# Patient Record
Sex: MALE | Race: WHITE | Employment: FULL TIME | URBAN - METROPOLITAN AREA
[De-identification: names, ages, dates, MRNs, and addresses within clinical notes are randomized per-mention and may not be internally consistent; named-entity substitution may affect disease eponyms.]

---

## 2017-09-15 ENCOUNTER — HOSPITAL ENCOUNTER (EMERGENCY)
Facility: CLINIC | Age: 37
Discharge: HOME | End: 2017-09-15
Attending: EMERGENCY MEDICINE | Admitting: EMERGENCY MEDICINE

## 2017-09-15 VITALS
SYSTOLIC BLOOD PRESSURE: 147 MMHG | OXYGEN SATURATION: 96 % | DIASTOLIC BLOOD PRESSURE: 95 MMHG | HEIGHT: 73 IN | RESPIRATION RATE: 16 BRPM | TEMPERATURE: 98.3 F

## 2017-09-15 DIAGNOSIS — K04.7 DENTAL INFECTION: ICD-10-CM

## 2017-09-15 PROCEDURE — 99283 EMERGENCY DEPT VISIT LOW MDM: CPT

## 2017-09-15 PROCEDURE — 99283 EMERGENCY DEPT VISIT LOW MDM: CPT | Performed by: EMERGENCY MEDICINE

## 2017-09-15 RX ORDER — PENICILLIN V POTASSIUM 500 MG/1
500 TABLET, FILM COATED ORAL 4 TIMES DAILY
Qty: 28 TABLET | Refills: 0 | Status: SHIPPED | OUTPATIENT
Start: 2017-09-15 | End: 2017-09-22

## 2017-09-15 NOTE — DISCHARGE INSTRUCTIONS
Dental Abscess  An abscess is a sac of pus. A dental abscess forms when a tooth or the tissue around it becomes infected with bacteria. The bacteria can enter through a cavity or a crack in a tooth. It can also infect the gum tissue or bone around a tooth. An untreated abscess can cause the loss of the tooth. It can even spread to other parts of the body and become life-threatening.    Symptoms of a dental abscess   Signs of a dental abscess include:    Toothache, often severe    Tooth pain with hot, cold, or pressure    Pain in the gums, cheek, or jaw    Bad breath or bitter taste in the mouth    Trouble swallowing or opening the mouth    Fever    Swollen or enlarged glands in the neck  Diagnosing a dental abscess  An abscess is diagnosed by looking at your teeth and gums. You will be told if any tests, like dental X-rays, are needed.  Treating a dental abscess  Treatments for a dental abscess may include the following:    Antibiotic medicines to treat the underlying infection.    Pain relievers to help you feel more comfortable. Your health care provider may prescribe a medicine for you. Or, use over-the-counter pain relievers, like acetaminophen or ibuprofen.    Warm saltwater rinses to soothe discomfort and help clear away pus.    Root canal surgery if needed to save the tooth. With a root canal, the infected part of the tooth is removed. A special substance is then used to fill the empty space in the tooth.    Drainage of the abscess if needed. Incisions are made to allow the infected material to drain from the tooth.    Removal of the tooth in cases of severe infection that can t be treated another way.  If the infection is severe, has spread, or doesn t respond to treatment, you may need to be admitted to a hospital.        When to call the dentist  Call your dentist right away if you have any of the following:    Fever of 100.4 F (38 C) or higher    Increased pain, redness, drainage, or swelling in the  treated area    Swelling of the face or jawbone    Pain that cannot be controlled with medicines   Preventing dental abscess  To prevent another abscess in the future, keep your teeth clean and healthy. Brush twice a day and floss at least once daily. See your dentist for regular tooth cleanings. And avoid sugary foods and drinks that can lead to tooth decay.  Date Last Reviewed: 7/14/2015 2000-2017 [employer]. [address] [place] [address], [place] [address]. All rights reserved. This information is not intended as a substitute for professional medical care. Always follow your healthcare professional's instructions.

## 2017-09-15 NOTE — ED AVS SNAPSHOT
[facility] Emergency Department    [address] [facility] [address]    Phone:  [phone]    Fax:  [phone]                                       Gopi Ashby   MRN: [id number]    Department:  [facility] Emergency Department   Date of Visit:  9/15/2017           Patient Information     Date Of Birth          1980        Your diagnoses for this visit were:     Dental infection        You were seen by John Michele MD.        Discharge Instructions         Dental Abscess  An abscess is a sac of pus. A dental abscess forms when a tooth or the tissue around it becomes infected with bacteria. The bacteria can enter through a cavity or a crack in a tooth. It can also infect the gum tissue or bone around a tooth. An untreated abscess can cause the loss of the tooth. It can even spread to other parts of the body and become life-threatening.    Symptoms of a dental abscess   Signs of a dental abscess include:    Toothache, often severe    Tooth pain with hot, cold, or pressure    Pain in the gums, cheek, or jaw    Bad breath or bitter taste in the mouth    Trouble swallowing or opening the mouth    Fever    Swollen or enlarged glands in the neck  Diagnosing a dental abscess  An abscess is diagnosed by looking at your teeth and gums. You will be told if any tests, like dental X-rays, are needed.  Treating a dental abscess  Treatments for a dental abscess may include the following:    Antibiotic medicines to treat the underlying infection.    Pain relievers to help you feel more comfortable. Your health care provider may prescribe a medicine for you. Or, use over-the-counter pain relievers, like acetaminophen or ibuprofen.    Warm saltwater rinses to soothe discomfort and help clear away pus.    Root canal surgery if needed to save the tooth. With a root canal, the infected part of the tooth is removed. A special substance is then used to fill the empty space in the  tooth.    Drainage of the abscess if needed. Incisions are made to allow the infected material to drain from the tooth.    Removal of the tooth in cases of severe infection that can t be treated another way.  If the infection is severe, has spread, or doesn t respond to treatment, you may need to be admitted to a hospital.        When to call the dentist  Call your dentist right away if you have any of the following:    Fever of 100.4 F (38 C) or higher    Increased pain, redness, drainage, or swelling in the treated area    Swelling of the face or jawbone    Pain that cannot be controlled with medicines   Preventing dental abscess  To prevent another abscess in the future, keep your teeth clean and healthy. Brush twice a day and floss at least once daily. See your dentist for regular tooth cleanings. And avoid sugary foods and drinks that can lead to tooth decay.  Date Last Reviewed: 7/14/2015 2000-2017 [employer]. [address] [place] [address]. All rights reserved. This information is not intended as a substitute for professional medical care. Always follow your healthcare professional's instructions.          24 Hour Appointment Hotline       To make an appointment at any [facility], call [phone] ([phone]). If you don't have a family doctor or clinic, we will help you find one. [facility] clinics are conveniently located to serve the needs of you and your family.             Review of your medicines      START taking        Dose / Directions Last dose taken    penicillin V potassium 500 MG tablet   Commonly known as:  VEETID   Dose:  500 mg   Quantity:  28 tablet        Take 1 tablet (500 mg) by mouth 4 times daily for 7 days   Refills:  0          Our records show that you are taking the medicines listed below. If these are incorrect, please call your family doctor or clinic.        Dose / Directions Last dose taken    LEVOTHYROXINE SODIUM PO   Dose:  175 mcg         "Take 175 mcg by mouth daily   Refills:  0                Prescriptions were sent or printed at these locations (1 Prescription)                   [facility] - [place], [place] - [address] AT [facility] OF [address]   [address] [facility] [address]    Telephone:  [phone]   Fax:  [phone]   Hours:                  E-Prescribed (1 of 1)         penicillin V potassium (VEETID) 500 MG tablet                Orders Needing Specimen Collection     None      Pending Results     No orders found from 9/13/2017 to 9/16/2017.            Pending Culture Results     No orders found from 9/13/2017 to 9/16/2017.            Pending Results Instructions     If you had any lab results that were not finalized at the time of your Discharge, you can call the ED Lab Result RN at [phone]. You will be contacted by this team for any positive Lab results or changes in treatment. The nurses are available 7 days a week from 10A to 6:30P.  You can leave a message 24 hours per day and they will return your call.        Test Results From Your Hospital Stay               Thank you for choosing [facility]       Thank you for choosing [facility] for your care. Our goal is always to provide you with excellent care. Hearing back from our patients is one way we can continue to improve our services. Please take a few minutes to complete the written survey that you may receive in the mail after you visit with us. Thank you!        [facility] Information     [facility] lets you send messages to your doctor, view your test results, renew your prescriptions, schedule appointments and more. To sign up, go to [email or web address] . Click on \"Log in\" on the left side of the screen, which will take you to the Welcome page. Then click on \"Sign up Now\" on the right side of the page.     You will be asked to enter the access code listed below, as well as some personal information. Please follow the directions to create " your username and password.     Your access code is: [email or web address]  Expires: 2017  5:43 PM     Your access code will  in 90 days. If you need help or a new code, please call your [facility] clinic or [phone].        Care EveryWhere ID     This is your Care EveryWhere ID. This could be used by other organizations to access your [facility] medical records  [id number]        Equal Access to Services     [facility]: Hadii alida dillard hadasho Soomaali, waaxda luqadaha, qaybta kaalmada adeegyada, prashanth mccoy . So [facility] [phone].    ATENCIÓN: Si habla español, tiene a ramírez disposición servicios gratuitos de asistencia lingüística. Llame al [phone].    We comply with applicable federal civil rights laws and [place] laws. We do not discriminate on the basis of race, color, national origin, age, disability sex, sexual orientation or gender identity.            After Visit Summary       This is your record. Keep this with you and show to your community pharmacist(s) and doctor(s) at your next visit.

## 2017-09-15 NOTE — ED AVS SNAPSHOT
[facility] Emergency Department    [address] [facility] [address]    Phone:  [phone]    Fax:  [phone]                                       Gopi Ashby   MRN: [id number]    Department:  [facility] Emergency Department   Date of Visit:  9/15/2017           After Visit Summary Signature Page     I have received my discharge instructions, and my questions have been answered. I have discussed any challenges I see with this plan with the nurse or doctor.    ..........................................................................................................................................  Patient/Patient Representative Signature      ..........................................................................................................................................  Patient Representative Print Name and Relationship to Patient    ..................................................               ................................................  Date                                            Time    ..........................................................................................................................................  Reviewed by Signature/Title    ...................................................              ..............................................  Date                                                            Time

## 2017-09-16 NOTE — ED PROVIDER NOTES
"Chief complaint dental pain, facial swelling    36-year-old male presents with dental pain and right lower, tooth #29, bothering him for the past few days, developed swelling today.  Denies fever.    Past medical history, medications, allergies, social history, family history all reviewed.  Patient Active Problem List   Diagnosis     Hyperthyroidism     CARDIOVASCULAR SCREENING; LDL GOAL LESS THAN 160     Problem focused review of systems otherwise negative    BP (!) 147/95  Temp 98.3  F (36.8  C) (Oral)  Resp 16  Ht 1.854 m (6' 1\")  SpO2 96%  Nontoxic.  Respiratory distress alert and oriented ×3  Oropharynx moist without lesions erythema or exudate, he does have some swelling and mild redness of the gingiva as well as the sulcus adjacent to tooth #29, no fluctuance no pointing abscess, mild associated swelling externally, mild associated tenderness, minimal right submandibular adenopathy.    MDM: dental infection, no indication for IV antibiotics or further evaluation.  Penicillin prescription ×10 days, warm packs, dentist, return criteria reviewed.    Impression: Dental infection     John Michele MD  09/15/17 8297    "

## 2018-06-01 ENCOUNTER — TELEPHONE (OUTPATIENT)
Dept: UROLOGY | Facility: CLINIC | Age: 38
End: 2018-06-01

## 2018-06-01 NOTE — TELEPHONE ENCOUNTER
"'s office called (Nancy Lavallette) with patient on line as well.   Patient has had gross hematuria x2 weeks. No fever, no hx of prostate or bladder issues that he is aware of.   CT scan negative per verbal report    [facility] office requesting appointment with urology (\"as soon as possible\").   Patient agreeable to go to other facility ([facility]) if needed.  Declined numbers to surrounding clinics to check availabilty of providers. Stated he would \"wait to hear when we could see him\".   Does not appear to have been seen, no records noted from recent visit- Advised patient to have records with him or sign release for provider's review on/or before appointment time.     Discussed if inability to void occurs, clotting, fever or pain patient should come to ED- agreeable to plan. Has not had any of those issues at this time.     Dr. Thomas- Should patient wait until next available opening? Or fit into schedule sooner?     Thank you,  Marty CHILDS RN   Specialty Clinics     "

## 2018-10-26 ENCOUNTER — HOSPITAL ENCOUNTER (EMERGENCY)
Facility: CLINIC | Age: 38
Discharge: HOME | End: 2018-10-26
Attending: FAMILY MEDICINE | Admitting: FAMILY MEDICINE

## 2018-10-26 VITALS
DIASTOLIC BLOOD PRESSURE: 122 MMHG | RESPIRATION RATE: 18 BRPM | OXYGEN SATURATION: 93 % | BODY MASS INDEX: 30.8 KG/M2 | SYSTOLIC BLOOD PRESSURE: 182 MMHG | WEIGHT: 240 LBS | TEMPERATURE: 97.3 F | HEIGHT: 74 IN

## 2018-10-26 DIAGNOSIS — E03.9 ACQUIRED HYPOTHYROIDISM: ICD-10-CM

## 2018-10-26 DIAGNOSIS — R31.0 GROSS HEMATURIA: ICD-10-CM

## 2018-10-26 DIAGNOSIS — K12.2 UVULITIS: ICD-10-CM

## 2018-10-26 DIAGNOSIS — R03.0 ELEVATED BP WITHOUT DIAGNOSIS OF HYPERTENSION: ICD-10-CM

## 2018-10-26 PROBLEM — G47.33 OBSTRUCTIVE SLEEP APNEA SYNDROME: Status: ACTIVE | Noted: 2017-08-25

## 2018-10-26 LAB
ALBUMIN SERPL-MCNC: 2.9 G/DL (ref 3.4–5)
ALP SERPL-CCNC: 104 U/L (ref 40–150)
ALT SERPL W P-5'-P-CCNC: 36 U/L (ref 0–70)
ANION GAP SERPL CALCULATED.3IONS-SCNC: 8 MMOL/L (ref 3–14)
AST SERPL W P-5'-P-CCNC: 60 U/L (ref 0–45)
BASOPHILS # BLD AUTO: 0 10E9/L (ref 0–0.2)
BASOPHILS NFR BLD AUTO: 0.1 %
BILIRUB SERPL-MCNC: 0.6 MG/DL (ref 0.2–1.3)
BUN SERPL-MCNC: 11 MG/DL (ref 7–30)
CALCIUM SERPL-MCNC: 7.8 MG/DL (ref 8.5–10.1)
CHLORIDE SERPL-SCNC: 100 MMOL/L (ref 94–109)
CO2 SERPL-SCNC: 33 MMOL/L (ref 20–32)
CREAT SERPL-MCNC: 1.35 MG/DL (ref 0.66–1.25)
DEPRECATED S PYO AG THROAT QL EIA: NORMAL
DIFFERENTIAL METHOD BLD: ABNORMAL
EOSINOPHIL # BLD AUTO: 0.1 10E9/L (ref 0–0.7)
EOSINOPHIL NFR BLD AUTO: 1.2 %
ERYTHROCYTE [DISTWIDTH] IN BLOOD BY AUTOMATED COUNT: 14.6 % (ref 10–15)
GFR SERPL CREATININE-BSD FRML MDRD: 59 ML/MIN/1.7M2
GLUCOSE SERPL-MCNC: 105 MG/DL (ref 70–99)
HCT VFR BLD AUTO: 49.6 % (ref 40–53)
HGB BLD-MCNC: 16.6 G/DL (ref 13.3–17.7)
IMM GRANULOCYTES # BLD: 0 10E9/L (ref 0–0.4)
IMM GRANULOCYTES NFR BLD: 0.4 %
LYMPHOCYTES # BLD AUTO: 2.1 10E9/L (ref 0.8–5.3)
LYMPHOCYTES NFR BLD AUTO: 20.7 %
MCH RBC QN AUTO: 35 PG (ref 26.5–33)
MCHC RBC AUTO-ENTMCNC: 33.5 G/DL (ref 31.5–36.5)
MCV RBC AUTO: 105 FL (ref 78–100)
MONOCYTES # BLD AUTO: 0.7 10E9/L (ref 0–1.3)
MONOCYTES NFR BLD AUTO: 6.9 %
NEUTROPHILS # BLD AUTO: 7 10E9/L (ref 1.6–8.3)
NEUTROPHILS NFR BLD AUTO: 70.7 %
NRBC # BLD AUTO: 0 10*3/UL
NRBC BLD AUTO-RTO: 0 /100
PLATELET # BLD AUTO: 221 10E9/L (ref 150–450)
POTASSIUM SERPL-SCNC: 3.4 MMOL/L (ref 3.4–5.3)
PROT SERPL-MCNC: 7.5 G/DL (ref 6.8–8.8)
RBC # BLD AUTO: 4.74 10E12/L (ref 4.4–5.9)
SODIUM SERPL-SCNC: 141 MMOL/L (ref 133–144)
SPECIMEN SOURCE: NORMAL
T4 FREE SERPL-MCNC: 0.28 NG/DL (ref 0.76–1.46)
TSH SERPL DL<=0.005 MIU/L-ACNC: 139.47 MU/L (ref 0.4–4)
WBC # BLD AUTO: 9.9 10E9/L (ref 4–11)

## 2018-10-26 PROCEDURE — 84443 ASSAY THYROID STIM HORMONE: CPT | Performed by: FAMILY MEDICINE

## 2018-10-26 PROCEDURE — 85025 COMPLETE CBC W/AUTO DIFF WBC: CPT | Performed by: FAMILY MEDICINE

## 2018-10-26 PROCEDURE — 96360 HYDRATION IV INFUSION INIT: CPT | Performed by: FAMILY MEDICINE

## 2018-10-26 PROCEDURE — 99284 EMERGENCY DEPT VISIT MOD MDM: CPT | Mod: Z6 | Performed by: FAMILY MEDICINE

## 2018-10-26 PROCEDURE — 25000128 H RX IP 250 OP 636: Performed by: FAMILY MEDICINE

## 2018-10-26 PROCEDURE — 84439 ASSAY OF FREE THYROXINE: CPT | Performed by: FAMILY MEDICINE

## 2018-10-26 PROCEDURE — 99284 EMERGENCY DEPT VISIT MOD MDM: CPT | Mod: 25 | Performed by: FAMILY MEDICINE

## 2018-10-26 PROCEDURE — 87880 STREP A ASSAY W/OPTIC: CPT | Performed by: FAMILY MEDICINE

## 2018-10-26 PROCEDURE — 80053 COMPREHEN METABOLIC PANEL: CPT | Performed by: FAMILY MEDICINE

## 2018-10-26 PROCEDURE — 87081 CULTURE SCREEN ONLY: CPT | Performed by: FAMILY MEDICINE

## 2018-10-26 PROCEDURE — 96372 THER/PROPH/DIAG INJ SC/IM: CPT | Performed by: FAMILY MEDICINE

## 2018-10-26 RX ORDER — SODIUM CHLORIDE 9 MG/ML
1000 INJECTION, SOLUTION INTRAVENOUS CONTINUOUS
Status: DISCONTINUED | OUTPATIENT
Start: 2018-10-26 | End: 2018-10-26 | Stop reason: HOSPADM

## 2018-10-26 RX ORDER — PENICILLIN V POTASSIUM 500 MG/1
500 TABLET, FILM COATED ORAL 2 TIMES DAILY
Qty: 20 TABLET | Refills: 0 | Status: SHIPPED | OUTPATIENT
Start: 2018-10-26 | End: 2018-11-05

## 2018-10-26 RX ADMIN — SODIUM CHLORIDE 1000 ML: 9 INJECTION, SOLUTION INTRAVENOUS at 10:57

## 2018-10-26 RX ADMIN — PENICILLIN G BENZATHINE AND PENICILLIN G PROCAINE 1.2 MILLION UNITS: 600000; 600000 INJECTION, SUSPENSION INTRAMUSCULAR at 11:56

## 2018-10-26 ASSESSMENT — ENCOUNTER SYMPTOMS
DIARRHEA: 0
BACK PAIN: 0
FATIGUE: 1
CHILLS: 0
DIAPHORESIS: 0
FEVER: 0
HEMATURIA: 1
SORE THROAT: 1
BLOOD IN STOOL: 0
COUGH: 1
SHORTNESS OF BREATH: 0
FLANK PAIN: 0
WHEEZING: 0
SINUS PRESSURE: 0
PALPITATIONS: 0
VOMITING: 0
ABDOMINAL PAIN: 0
CONSTIPATION: 0
PHOTOPHOBIA: 0
HEADACHES: 0
DYSURIA: 0
FREQUENCY: 0
FACIAL SWELLING: 1
NAUSEA: 0

## 2018-10-26 NOTE — ED AVS SNAPSHOT
[facility] Emergency Department    [address] [facility] [address]    Phone:  [phone]    Fax:  [phone]                                       Gopi Ashby   MRN: [id number]    Department:  [facility] Emergency Department   Date of Visit:  10/26/2018           Patient Information     Date Of Birth          1980        Your diagnoses for this visit were:     Uvulitis Start penicillin and take for 10 days. recheck next week.  return for difficult breathing, wheezing, neck swelling, drooling.    Acquired hypothyroidism This absolutely needs restarting synthroid. please discuss with your primary provider, this is responsible for fatigue, eyelid swelling, blood pressure change as weel    Elevated BP without diagnosis of hypertension likely combo related to untreated hypothyroidism, sleep apnea - needs recheck in clinic. restart synthroid asap.    Gross hematuria continue evaluation with [facility] primary doctor who has already seen you for this. typically CT urogram and urology for cystoscopy and other testing is important. [place]. with history of tobacco use.  return for fever, flank pain. we did not evaluate this today as was not the reason for your presentation and has already been partially worked up in clinic       You were seen by John Hickman MD.      Follow-up Information     Schedule an appointment as soon as possible for a visit with Clinic, [facility].    Contact information:    [address] [place] [address] [phone]          Follow up with [facility] Emergency Department.    Specialty:  EMERGENCY MEDICINE    Why:  As needed, If symptoms worsen    Contact information:    [address] [place] [address] [phone]    Additional information:    The medical center is located at   [address] [facility]. (between [address] and   [address] in [place], [place] north   of [facility]).        Discharge Instructions         ICD-10-CM     1. Uvulitis K12.2     Start penicillin and take for 10 days. recheck next week.  return for difficult breathing, wheezing, neck swelling, drooling.   2. Acquired hypothyroidism E03.9     This absolutely needs restarting synthroid. please discuss with your primary provider, this is responsible for fatigue, eyelid swelling, blood pressure change as weel   3. Elevated BP without diagnosis of hypertension R03.0     likely combo related to untreated hypothyroidism, sleep apnea - needs recheck in clinic. restart synthroid asap.   4. Gross hematuria R31.0     continue evaluation with [facility] primary doctor who has already seen you for this. typically CT urogram and urology for cystoscopy and other testing is important. [place]. with history of tobacco use.  return for fever, flank pain. we did not evaluate this today as was not the reason for your presentation and has already been partially worked up in clinic           Discharge References/Attachments     PHARYNGITIS, STREP (PRESUMED) (ENGLISH)      24 Hour Appointment Hotline       To make an appointment at any [facility], call [phone]). If you don't have a family doctor or clinic, we will help you find one. [facility] clinics are conveniently located to serve the needs of you and your family.             Review of your medicines      Our records show that you are taking the medicines listed below. If these are incorrect, please call your family doctor or clinic.        Dose / Directions Last dose taken    LEVOTHYROXINE SODIUM PO   Dose:  175 mcg        Take 175 mcg by mouth daily   Refills:  0                Procedures and tests performed during your visit     Beta strep group A culture    CBC with platelets differential    Comprehensive metabolic panel    Peripheral IV catheter    Rapid Strep Screen    TSH with free T4 reflex      Orders Needing Specimen Collection     Ordered          10/26/18 1044  UA reflex to Microscopic and Culture - STAT,  Prio: STAT, Status: Sent     Scheduled Task Status   10/26/18 1044 [facility] Reminder: Open   Order Class:  PCU Collect                  Pending Results     Date and Time Order Name Status Description    10/26/2018 1101 Beta strep group A culture In process     10/26/2018 1044 TSH with free T4 reflex In process             Pending Culture Results     Date and Time Order Name Status Description    10/26/2018 1101 Beta strep group A culture In process             Pending Results Instructions     If you had any lab results that were not finalized at the time of your Discharge, you can call the ED Lab Result RN at [phone]. You will be contacted by this team for any positive Lab results or changes in treatment. The nurses are available 7 days a week from 10A to 6:30P.  You can leave a message 24 hours per day and they will return your call.        Test Results From Your Hospital Stay        10/26/2018 11:00 AM      Component Results     Component Value Ref Range & Units Status    WBC 9.9 4.0 - 11.0 10e9/L Final    RBC Count 4.74 4.4 - 5.9 10e12/L Final    Hemoglobin 16.6 13.3 - 17.7 g/dL Final    Hematocrit 49.6 40.0 - 53.0 % Final     (H) 78 - 100 fl Final    MCH 35.0 (H) 26.5 - 33.0 pg Final    MCHC 33.5 31.5 - 36.5 g/dL Final    RDW 14.6 10.0 - 15.0 % Final    Platelet Count 221 150 - 450 10e9/L Final    Diff Method Automated Method  Final    % Neutrophils 70.7 % Final    % Lymphocytes 20.7 % Final    % Monocytes 6.9 % Final    % Eosinophils 1.2 % Final    % Basophils 0.1 % Final    % Immature Granulocytes 0.4 % Final    Nucleated RBCs 0 0 /100 Final    Absolute Neutrophil 7.0 1.6 - 8.3 10e9/L Final    Absolute Lymphocytes 2.1 0.8 - 5.3 10e9/L Final    Absolute Monocytes 0.7 0.0 - 1.3 10e9/L Final    Absolute Eosinophils 0.1 0.0 - 0.7 10e9/L Final    Absolute Basophils 0.0 0.0 - 0.2 10e9/L Final    Abs Immature Granulocytes 0.0 0 - 0.4 10e9/L Final    Absolute Nucleated RBC 0.0  Final         10/26/2018  "11:19 AM      Component Results     Component Value Ref Range & Units Status    Sodium 141 133 - 144 mmol/L Final    Potassium 3.4 3.4 - 5.3 mmol/L Final    Chloride 100 94 - 109 mmol/L Final    Carbon Dioxide 33 (H) 20 - 32 mmol/L Final    Anion Gap 8 3 - 14 mmol/L Final    Glucose 105 (H) 70 - 99 mg/dL Final    Urea Nitrogen 11 7 - 30 mg/dL Final    Creatinine 1.35 (H) 0.66 - 1.25 mg/dL Final    GFR Estimate 59 (L) >60 mL/min/1.7m2 Final    Non  GFR Calc    GFR Estimate If Black 71 >60 mL/min/1.7m2 Final    African American GFR Calc    Calcium 7.8 (L) 8.5 - 10.1 mg/dL Final    Bilirubin Total 0.6 0.2 - 1.3 mg/dL Final    Albumin 2.9 (L) 3.4 - 5.0 g/dL Final    Protein Total 7.5 6.8 - 8.8 g/dL Final    Alkaline Phosphatase 104 40 - 150 U/L Final    ALT 36 0 - 70 U/L Final    AST 60 (H) 0 - 45 U/L Final         10/26/2018 10:54 AM         10/26/2018 11:21 AM      Component Results     Component    Specimen Description    Throat    Rapid Strep A Screen    NEGATIVE: No Group A streptococcal antigen detected by immunoassay, await culture report.         10/26/2018 11:21 AM                Thank you for choosing [facility]       Thank you for choosing [facility] for your care. Our goal is always to provide you with excellent care. Hearing back from our patients is one way we can continue to improve our services. Please take a few minutes to complete the written survey that you may receive in the mail after you visit with us. Thank you!        [facility] Information     [facility] lets you send messages to your doctor, view your test results, renew your prescriptions, schedule appointments and more. To sign up, go to [email or web address] . Click on \"Log in\" on the left side of the screen, which will take you to the Welcome page. Then click on \"Sign up Now\" on the right side of the page.     You will be asked to enter the access code listed below, as well as some personal information. Please follow the directions " to create your username and password.     Your access code is: [email or web address]  Expires: 2019 11:34 AM     Your access code will  in 90 days. If you need help or a new code, please call your [facility] clinic or [phone].        Care EveryWhere ID     This is your Care EveryWhere ID. This could be used by other organizations to access your [facility] medical records  [id number]        Equal Access to Services     [facility]: Hadii alida dillard hadasho Soradhaali, waaxda luqadaha, qaybta kaalmada adeegyada, prashanth mccoy . So [facility] [phone].    ATENCIÓN: Si habla español, tiene a ramírez disposición servicios gratuitos de asistencia lingüística. Llame al [phone].    We comply with applicable federal civil rights laws and [place] laws. We do not discriminate on the basis of race, color, national origin, age, disability, sex, sexual orientation, or gender identity.            After Visit Summary       This is your record. Keep this with you and show to your community pharmacist(s) and doctor(s) at your next visit.

## 2018-10-26 NOTE — DISCHARGE INSTRUCTIONS
ICD-10-CM    1. Uvulitis K12.2     Start penicillin and take for 10 days. recheck next week.  return for difficult breathing, wheezing, neck swelling, drooling.   2. Acquired hypothyroidism E03.9     This absolutely needs restarting synthroid. please discuss with your primary provider, this is responsible for fatigue, eyelid swelling, blood pressure change as weel   3. Elevated BP without diagnosis of hypertension R03.0     likely combo related to untreated hypothyroidism, sleep apnea - needs recheck in clinic. restart synthroid asap.   4. Gross hematuria R31.0     continue evaluation with [facility] primary doctor who has already seen you for this. typically CT urogram and urology for cystoscopy and other testing is important. [place]. with history of tobacco use.  return for fever, flank pain. we did not evaluate this today as was not the reason for your presentation and has already been partially worked up in clinic

## 2018-10-26 NOTE — ED AVS SNAPSHOT
[facility] Emergency Department    [address] [address]    Phone:  [phone]    Fax:  [phone]                                       Gopi Ashby   MRN: [id number]    Department:  [facility] Emergency Department   Date of Visit:  10/26/2018           After Visit Summary Signature Page     I have received my discharge instructions, and my questions have been answered. I have discussed any challenges I see with this plan with the nurse or doctor.    ..........................................................................................................................................  Patient/Patient Representative Signature      ..........................................................................................................................................  Patient Representative Print Name and Relationship to Patient    ..................................................               ................................................  Date                                   Time    ..........................................................................................................................................  Reviewed by Signature/Title    ...................................................              ..............................................  Date                                               Time          22EPIC Rev 08/18

## 2018-10-26 NOTE — ED PROVIDER NOTES
History     Chief Complaint   Patient presents with     Hematuria     sick for a couple weeks, sore throat     HPI  Gopi Ashby is a 37 year old male who has a history of hypothyroidism, sleep apnea, and tobacco abuse that presents to the ED for evaluation of pharyngitis. .       The patient also states that he recently stopped taking his levothyroxine for his hypothyroidism approximately one month ago. He states that he was no longer able to fill the prescription and stopped taking it without doctor approval.  He states that he has noticed facial swelling perioorbital - lids and swelling around his bilateral ankles.    He states that he has been experiencing hematuria for over a month and has been seen by a provider for this concern.   He tells me that CT has been done, but I do not have record of recent scan.   He also has not seen urology in follow-up    . The patient also states that for the last week he has been experiencing a sore throat with little energy and an intermittent productive cough, post-nasal drainage, rhinorrhea.        The patient denies any fevers, chills, nausea, vomiting  No shortness of breath, stridor or wheezing  Problem List:    Patient Active Problem List    Diagnosis Date Noted     CARDIOVASCULAR SCREENING; LDL GOAL LESS THAN 160 10/31/2010     Priority: Medium     Hyperthyroidism 03/08/2010     Priority: Medium        Past Medical History:    No past medical history on file.    Past Surgical History:    No past surgical history on file.    Family History:    Family History   Problem Relation Age of Onset     Unknown/Adopted Mother      Unknown/Adopted Father      Unknown/Adopted Maternal Grandmother      Unknown/Adopted Maternal Grandfather      Unknown/Adopted Paternal Grandmother      Unknown/Adopted Paternal Grandfather        Social History:  Marital Status:  Single [1]  Social History   Substance Use Topics     Smoking status: Current Every Day Smoker     Packs/day:  "1.00     Types: Cigarettes     Smokeless tobacco: Not on file     Alcohol use No        Medications:      LEVOTHYROXINE SODIUM PO         Review of Systems   Constitutional: Positive for fatigue. Negative for chills, diaphoresis and fever.   HENT: Positive for facial swelling and sore throat. Negative for ear pain and sinus pressure.    Eyes: Negative for photophobia and visual disturbance.   Respiratory: Positive for cough. Negative for shortness of breath and wheezing.    Cardiovascular: Negative for chest pain and palpitations.   Gastrointestinal: Negative for abdominal pain, blood in stool, constipation, diarrhea, nausea and vomiting.   Genitourinary: Positive for hematuria. Negative for dysuria, flank pain, frequency and urgency.   Musculoskeletal: Negative for back pain.   Skin: Negative for pallor and rash.   Neurological: Negative for headaches.   All other systems reviewed and are negative.      Physical Exam   BP: (!) 182/122  Heart Rate: 118  Temp: 97.3  F (36.3  C)  Resp: 18  Height: 186.7 cm (6' 1.5\")  Weight: 108.9 kg (240 lb)  SpO2: 93 %      Physical Exam   Constitutional: He is oriented to person, place, and time. He appears distressed.   HENT:   Head: Normocephalic and atraumatic.   Mouth/Throat: Mucous membranes are normal. No trismus in the jaw. Uvula swelling present. No dental abscesses or dental caries. Posterior oropharyngeal erythema present. No oropharyngeal exudate, posterior oropharyngeal edema or tonsillar abscesses.   Eyes: Conjunctivae and EOM are normal. Pupils are equal, round, and reactive to light.   Neck: Normal range of motion. Neck supple.   Cardiovascular: Normal rate, regular rhythm and normal heart sounds.  Exam reveals no gallop and no friction rub.    No murmur heard.  Pulmonary/Chest: Effort normal and breath sounds normal. He has no wheezes. He has no rales.   Abdominal: Soft. Bowel sounds are normal. There is no tenderness. There is no rebound and no guarding. "   Musculoskeletal: Normal range of motion. He exhibits no edema.   Neurological: He is alert and oriented to person, place, and time. He exhibits normal muscle tone.   Skin: Skin is warm. No rash noted. He is not diaphoretic.       ED Course     ED Course     Procedures               Critical Care time:  none               Results for orders placed or performed during the hospital encounter of 10/26/18 (from the past 24 hour(s))   CBC with platelets differential   Result Value Ref Range    WBC 9.9 4.0 - 11.0 10e9/L    RBC Count 4.74 4.4 - 5.9 10e12/L    Hemoglobin 16.6 13.3 - 17.7 g/dL    Hematocrit 49.6 40.0 - 53.0 %     (H) 78 - 100 fl    MCH 35.0 (H) 26.5 - 33.0 pg    MCHC 33.5 31.5 - 36.5 g/dL    RDW 14.6 10.0 - 15.0 %    Platelet Count 221 150 - 450 10e9/L    Diff Method Automated Method     % Neutrophils 70.7 %    % Lymphocytes 20.7 %    % Monocytes 6.9 %    % Eosinophils 1.2 %    % Basophils 0.1 %    % Immature Granulocytes 0.4 %    Nucleated RBCs 0 0 /100    Absolute Neutrophil 7.0 1.6 - 8.3 10e9/L    Absolute Lymphocytes 2.1 0.8 - 5.3 10e9/L    Absolute Monocytes 0.7 0.0 - 1.3 10e9/L    Absolute Eosinophils 0.1 0.0 - 0.7 10e9/L    Absolute Basophils 0.0 0.0 - 0.2 10e9/L    Abs Immature Granulocytes 0.0 0 - 0.4 10e9/L    Absolute Nucleated RBC 0.0    Comprehensive metabolic panel   Result Value Ref Range    Sodium 141 133 - 144 mmol/L    Potassium 3.4 3.4 - 5.3 mmol/L    Chloride 100 94 - 109 mmol/L    Carbon Dioxide 33 (H) 20 - 32 mmol/L    Anion Gap 8 3 - 14 mmol/L    Glucose 105 (H) 70 - 99 mg/dL    Urea Nitrogen 11 7 - 30 mg/dL    Creatinine 1.35 (H) 0.66 - 1.25 mg/dL    GFR Estimate 59 (L) >60 mL/min/1.7m2    GFR Estimate If Black 71 >60 mL/min/1.7m2    Calcium 7.8 (L) 8.5 - 10.1 mg/dL    Bilirubin Total PENDING 0.2 - 1.3 mg/dL    Albumin 2.9 (L) 3.4 - 5.0 g/dL    Protein Total PENDING 6.8 - 8.8 g/dL    Alkaline Phosphatase PENDING 40 - 150 U/L    ALT 36 0 - 70 U/L    AST 60 (H) 0 - 45 U/L        Medications   0.9% sodium chloride BOLUS (1,000 mLs Intravenous New Bag 10/26/18 1057)     Followed by   sodium chloride 0.9% infusion (not administered)     11:15 AM Patient assessed.  Assessments & Plan (with Medical Decision Making)     MDM: Gopi Ashby is a 37 year old male presented for pharyngitis and on examination has findings of uvulitis, an erythema of the peritonsillar pillars but no exudate no abscess.  He is nontoxic in appearance and afebrile.  Initial strep is negative but he is treated empirically for strep related changes -he is placed on penicillin but the patient cannot afford penicillin so is given a Bicillin CR injection.  I have asked him to follow-up for this early this coming week.  He is given precautions for return.    He also mentions several chronic conditions that are undertreated at this time.  He has a history of hypothyroidism but is stopped his Synthroid.  I asked him to restart this and talk to his primary provider.  He also has a history of hematuria and is yet to complete the workup for this including urology.  These were not addressed today as his primary complaint was pharyngitis.  He is in the [facility] patient and I am unable to schedule a follow-up at this time for recheck.     I have reviewed the nursing notes.    I have reviewed the findings, diagnosis, plan and need for follow up with the patient.       New Prescriptions    No medications on file       Final diagnoses:   Uvulitis - Start penicillin and take for 10 days. recheck next week.  return for difficult breathing, wheezing, neck swelling, drooling.   Acquired hypothyroidism - This absolutely needs restarting synthroid. please discuss with your primary provider, this is responsible for fatigue, eyelid swelling, blood pressure change as weel   Elevated BP without diagnosis of hypertension - likely combo related to untreated hypothyroidism, sleep apnea - needs recheck in clinic. restart synthroid asap.    Gross hematuria - continue evaluation with [facility] primary doctor who has already seen you for this. typically CT urogram and urology for cystoscopy and other testing is important. [place]. with history of tobacco use.  return for fever, flank pain. we did not evaluate this today as was not the reason for your presentation and has already been partially worked up in clinic     This document serves as a record of the services and decisions personally performed and made by John Hickman MD. It was created on his behalf by Gail Neely, a trained medical scribe. The creation of this document is based the provider's statements to the medical scribe.  Gail Neely 11:15 AM 10/26/2018    Provider:   The information in this document, created by the medical scribe for me, accurately reflects the services I personally performed and the decisions made by me. I have reviewed and approved this document for accuracy prior to leaving the patient care area.  John Hickman MD 11:15 AM 10/26/2018    10/26/2018   [facility] EMERGENCY DEPARTMENT     John Hickman MD  10/26/18 1944

## 2018-10-28 LAB
BACTERIA SPEC CULT: NORMAL
Lab: NORMAL
SPECIMEN SOURCE: NORMAL

## 2021-12-11 ENCOUNTER — ANCILLARY PROCEDURE (OUTPATIENT)
Dept: GENERAL RADIOLOGY | Facility: CLINIC | Age: 41
End: 2021-12-11
Attending: EMERGENCY MEDICINE

## 2021-12-11 ENCOUNTER — OFFICE VISIT (OUTPATIENT)
Dept: URGENT CARE | Facility: URGENT CARE | Age: 41
End: 2021-12-11

## 2021-12-11 VITALS
HEART RATE: 111 BPM | BODY MASS INDEX: 39.04 KG/M2 | WEIGHT: 300 LBS | TEMPERATURE: 98.6 F | RESPIRATION RATE: 20 BRPM | DIASTOLIC BLOOD PRESSURE: 82 MMHG | SYSTOLIC BLOOD PRESSURE: 136 MMHG | OXYGEN SATURATION: 89 %

## 2021-12-11 DIAGNOSIS — T14.90XA TRAUMA: Primary | ICD-10-CM

## 2021-12-11 PROCEDURE — 99203 OFFICE O/P NEW LOW 30 MIN: CPT | Performed by: EMERGENCY MEDICINE

## 2021-12-11 PROCEDURE — 73090 X-RAY EXAM OF FOREARM: CPT | Mod: RT | Performed by: RADIOLOGY

## 2021-12-11 NOTE — PROGRESS NOTES
CHIEF COMPLAINT: Injury right arm.      HPI: Patient is a 40-year-old male who fell about 4 feet from scaffolding 1 week ago.  He fell onto his right arm.  He complains of pain at the wrist and forearm and slight achiness in the shoulder.  He has occasional tingling in this hand which is not there now.  No other injuries or complaints.      ROS: See HPI otherwise normal.    No Known Allergies   Current Outpatient Medications   Medication Sig Dispense Refill     LEVOTHYROXINE SODIUM PO Take 175 mcg by mouth daily            PE: Physical exam reveals a 40-year-old male to be in no acute distress.  He is alert.  Examination of his right upper extremity reveals slight reproducible tenderness over the wrist and mid forearm although he definitely generates full range of motion without precipitating any pain.  And nontender.  Elbow nontender.  Full range of motion of the shoulder elicits no pain.  Sensorimotor exam are intact distally.        TREATMENT: X-ray right forearm: Negative      ASSESSMENT: Right forearm injury, appears to be soft tissue.  Follow-up critical in 1 week with possible orthopedic referral if symptoms do not improve.      DIAGNOSIS: Right forearm injury      PLAN: Quiet activity with right arm.  Warm soaks 2-3 times daily.  Return in 1 week if pain persist.

## 2021-12-11 NOTE — PATIENT INSTRUCTIONS
No heavy lifting or repetitive motion with right arm for 1 week    Warm soaks or heating pad 2-3 times daily    Follow-up in 1 week if pain persists.  Orthopedic referral might be necessary.